# Patient Record
Sex: FEMALE | Race: BLACK OR AFRICAN AMERICAN | NOT HISPANIC OR LATINO | RURAL
[De-identification: names, ages, dates, MRNs, and addresses within clinical notes are randomized per-mention and may not be internally consistent; named-entity substitution may affect disease eponyms.]

---

## 2018-09-27 PROBLEM — H52.4: Noted: 2018-09-27

## 2018-09-27 PROBLEM — E11.9: Noted: 2018-09-27

## 2020-08-19 ENCOUNTER — PREPPED CHART (OUTPATIENT)
Dept: RURAL CLINIC 3 | Facility: CLINIC | Age: 60
End: 2020-08-19

## 2020-08-19 ENCOUNTER — IMPORTED ENCOUNTER (OUTPATIENT)
Dept: URBAN - NONMETROPOLITAN AREA CLINIC 1 | Facility: CLINIC | Age: 60
End: 2020-08-19

## 2020-08-19 PROCEDURE — 92014 COMPRE OPH EXAM EST PT 1/>: CPT

## 2020-08-19 PROCEDURE — 92015 DETERMINE REFRACTIVE STATE: CPT

## 2020-08-19 NOTE — PATIENT DISCUSSION
Presbyopia OUDiscussed refractive status in detail with patient. New glasses Rx given today. Continue to monitor. IDDM sans Retinopathy Discussed diagnosis with patient. Discussed the risk of diabetic damage of the retina with potential vision loss and the importance of routine follow-up. Emphasized strict blood sugar control. Continue to monitor.

## 2022-03-02 ASSESSMENT — VISUAL ACUITY
OD_CC: 20/30-
OS_CC: 20/30

## 2022-03-02 ASSESSMENT — TONOMETRY
OS_IOP_MMHG: 17
OD_IOP_MMHG: 17

## 2022-03-04 ENCOUNTER — COMPREHENSIVE EXAM (OUTPATIENT)
Dept: RURAL CLINIC 3 | Facility: CLINIC | Age: 62
End: 2022-03-04

## 2022-03-04 DIAGNOSIS — H52.4: ICD-10-CM

## 2022-03-04 PROCEDURE — 92015 DETERMINE REFRACTIVE STATE: CPT

## 2022-03-04 PROCEDURE — 92014 COMPRE OPH EXAM EST PT 1/>: CPT

## 2022-03-04 ASSESSMENT — VISUAL ACUITY
OD_CC: 20/30-1
OS_CC: 20/30
OU_CC: 20/70

## 2022-03-04 ASSESSMENT — TONOMETRY
OD_IOP_MMHG: 15
OS_IOP_MMHG: 16

## 2022-03-04 NOTE — PATIENT DISCUSSION
Discussed diagnosis in detail with patient. *No diabetic retinopathy noted on exam today. *Mild retinopathy noted on exam. Stressed importance of good blood sugar control and how it can affect ocular health/vision. Recommend no soda’s.  Letter will be sent to PCP today as well. Continue to monitor closely for changes.

## 2022-04-10 ASSESSMENT — TONOMETRY
OS_IOP_MMHG: 17
OD_IOP_MMHG: 17

## 2022-04-10 ASSESSMENT — VISUAL ACUITY
OD_SC: 20/30-
OS_SC: 20/30

## 2022-08-03 ENCOUNTER — FOLLOW UP (OUTPATIENT)
Dept: RURAL CLINIC 3 | Facility: CLINIC | Age: 62
End: 2022-08-03

## 2022-08-03 DIAGNOSIS — H40.013: ICD-10-CM

## 2022-08-03 PROCEDURE — 92133 CPTRZD OPH DX IMG PST SGM ON: CPT

## 2022-08-03 PROCEDURE — 92083 EXTENDED VISUAL FIELD XM: CPT

## 2022-08-03 PROCEDURE — 99214 OFFICE O/P EST MOD 30 MIN: CPT

## 2022-08-03 ASSESSMENT — VISUAL ACUITY
OS_PH: 20/30
OD_PH: 20/30
OD_SC: 20/40-1
OS_SC: 20/40-1

## 2022-08-03 ASSESSMENT — TONOMETRY
OS_IOP_MMHG: 14
OD_IOP_MMHG: 14